# Patient Record
Sex: FEMALE
[De-identification: names, ages, dates, MRNs, and addresses within clinical notes are randomized per-mention and may not be internally consistent; named-entity substitution may affect disease eponyms.]

---

## 2019-02-18 ENCOUNTER — RESULT REVIEW (OUTPATIENT)
Age: 34
End: 2019-02-18

## 2019-10-16 PROBLEM — Z00.00 ENCOUNTER FOR PREVENTIVE HEALTH EXAMINATION: Status: ACTIVE | Noted: 2019-10-16

## 2019-10-30 ENCOUNTER — APPOINTMENT (OUTPATIENT)
Dept: PHYSICAL MEDICINE AND REHAB | Facility: CLINIC | Age: 34
End: 2019-10-30

## 2020-06-25 ENCOUNTER — RESULT REVIEW (OUTPATIENT)
Age: 35
End: 2020-06-25

## 2021-09-27 ENCOUNTER — APPOINTMENT (OUTPATIENT)
Dept: SURGERY | Facility: CLINIC | Age: 36
End: 2021-09-27
Payer: COMMERCIAL

## 2021-09-27 VITALS
HEART RATE: 71 BPM | HEIGHT: 68 IN | DIASTOLIC BLOOD PRESSURE: 78 MMHG | OXYGEN SATURATION: 96 % | BODY MASS INDEX: 25.05 KG/M2 | SYSTOLIC BLOOD PRESSURE: 113 MMHG | TEMPERATURE: 96.6 F | WEIGHT: 165.25 LBS

## 2021-09-27 DIAGNOSIS — Z78.9 OTHER SPECIFIED HEALTH STATUS: ICD-10-CM

## 2021-09-27 DIAGNOSIS — Z83.438 FAMILY HISTORY OF OTHER DISORDER OF LIPOPROTEIN METABOLISM AND OTHER LIPIDEMIA: ICD-10-CM

## 2021-09-27 DIAGNOSIS — Z84.1 FAMILY HISTORY OF DISORDERS OF KIDNEY AND URETER: ICD-10-CM

## 2021-09-27 DIAGNOSIS — Z83.79 FAMILY HISTORY OF OTHER DISEASES OF THE DIGESTIVE SYSTEM: ICD-10-CM

## 2021-09-27 DIAGNOSIS — Z80.49 FAMILY HISTORY OF MALIGNANT NEOPLASM OF OTHER GENITAL ORGANS: ICD-10-CM

## 2021-09-27 DIAGNOSIS — K80.10 CALCULUS OF GALLBLADDER WITH CHRONIC CHOLECYSTITIS W/OUT OBSTRUCTION: ICD-10-CM

## 2021-09-27 DIAGNOSIS — Z82.49 FAMILY HISTORY OF ISCHEMIC HEART DISEASE AND OTHER DISEASES OF THE CIRCULATORY SYSTEM: ICD-10-CM

## 2021-09-27 DIAGNOSIS — Z83.3 FAMILY HISTORY OF DIABETES MELLITUS: ICD-10-CM

## 2021-09-27 DIAGNOSIS — Z80.1 FAMILY HISTORY OF MALIGNANT NEOPLASM OF TRACHEA, BRONCHUS AND LUNG: ICD-10-CM

## 2021-09-27 PROCEDURE — 99244 OFF/OP CNSLTJ NEW/EST MOD 40: CPT

## 2021-09-27 NOTE — DATA REVIEWED
[FreeTextEntry1] : US abdomen (8/31/2021) - cholelithiasis without sonographic evidence of acute cholecystitis.

## 2021-09-27 NOTE — PHYSICAL EXAM
[Calm] : calm [de-identified] : NAD, comfortable [de-identified] : NCAT, no scleral icterus [de-identified] : +BS soft NT ND.  No hepatosplenomegaly. [de-identified] : No clubbing, cyanosis, or edema. [de-identified] : Warm, dry. [de-identified] : A&Ox3

## 2021-09-27 NOTE — ASSESSMENT
[FreeTextEntry1] : Ms. Samaniego is a 36-year-old woman with cholelithiasis and prior episodes of abdominal pain.  We discussed that it is unclear whether her abdominal pain episodes have been related to her cholelithiasis, but that if she wishes to proceed with pregnancy she should undergo a cholecystectomy prior to becoming pregnant to avoid any issues with the cholelithiasis during the pregnancy.  We will plan for a robotic-assisted cholecystectomy at the patient's convenience.

## 2021-09-27 NOTE — HISTORY OF PRESENT ILLNESS
[de-identified] : Ms. Samaniego presented today for evaluation and management of epigastric pain.  She stated the pain occurred once in August 2021.  She previously had episodes of right upper quadrant pain approximately 10 years ago and was diagnosed with gallstones.  The pain that she experienced 10 years ago was associated with eating fatty foods.  She denied associated fever, chills, nausea, vomiting, diarrhea, or constipation.  She has lost approximately 40 pounds over the last 6 months.

## 2021-09-27 NOTE — CONSULT LETTER
[FreeTextEntry1] : 2021\par \par \par \par Serge Dimas M.D.\par Main Office\par [address] [address]\par Telephone #:  [phone]\par \par \par Re:  Doug Samaniego\par :  1985\par \par \par Dear Dr. Dimas:\par \par I had the opportunity to see Ms. Pauline today for evaluation and management of epigastric pain.  She stated the pain occurred once in 2021.  She previously had episodes of right upper quadrant pain approximately 10 years ago and was diagnosed with gallstones.  The pain that she experienced 10 years ago was associated with eating fatty foods.  She denied associated fever, chills, nausea, vomiting, diarrhea, or constipation.  She has lost approximately 40 pounds over the last 6 months.\par \par On physical examination, her height is 5 feet 8 inches, weight is 165 pounds, and BMI 25.13.  Her temperature is 96.6 °F, blood pressure is 113/78, heart rate is 81, and O2 saturation is 96% on room air.  In general, she is a well-dressed, well-nourished woman who appears her stated age and is in no acute distress.  She is calm, alert and oriented x 3.  HEENT exam demonstrates a normocephalic atraumatic appearance with no scleral icterus.  Her abdomen has audible bowel sounds, is soft, non-tender, and non-distended.  There is no hepatosplenomegaly appreciated.  Her extremities are warm and dry without clubbing, cyanosis or edema.  \par \par I reviewed the report of the ultrasound of the abdomen that was performed on 2021, which demonstrated cholelithiasis without sonographic evidence of acute cholecystitis.\par \par In summary, Ms. Samaniego is a 36-year-old woman with cholelithiasis and prior episodes of abdominal pain.  We discussed that it is unclear whether her abdominal pain episodes have been related to her cholelithiasis, but that if she wishes to proceed with pregnancy she should undergo a cholecystectomy prior to becoming pregnant to avoid any issues with the cholelithiasis during the pregnancy.  We will plan for a robotic-assisted cholecystectomy at the patient's convenience.\par \par Thank you for the opportunity to care for this patient. Please do not hesitate to contact me in the event that you have any questions or concerns about the care of this patient.\par \par Sincerely,\par \par \par \par \par Mame Phillips M.D.

## 2022-06-24 ENCOUNTER — RESULT REVIEW (OUTPATIENT)
Age: 37
End: 2022-06-24